# Patient Record
Sex: FEMALE | Race: WHITE | NOT HISPANIC OR LATINO | Employment: OTHER | URBAN - METROPOLITAN AREA
[De-identification: names, ages, dates, MRNs, and addresses within clinical notes are randomized per-mention and may not be internally consistent; named-entity substitution may affect disease eponyms.]

---

## 2018-01-25 ENCOUNTER — TRANSCRIBE ORDERS (OUTPATIENT)
Dept: ADMINISTRATIVE | Facility: HOSPITAL | Age: 68
End: 2018-01-25

## 2018-01-25 DIAGNOSIS — Z12.39 SCREENING BREAST EXAMINATION: Primary | ICD-10-CM

## 2018-02-12 ENCOUNTER — HOSPITAL ENCOUNTER (OUTPATIENT)
Dept: RADIOLOGY | Facility: HOSPITAL | Age: 68
Discharge: HOME | End: 2018-02-12
Payer: MEDICARE

## 2018-02-12 DIAGNOSIS — Z12.39 SCREENING BREAST EXAMINATION: ICD-10-CM

## 2018-02-12 PROCEDURE — 77067 SCR MAMMO BI INCL CAD: CPT

## 2018-02-12 PROCEDURE — 77063 BREAST TOMOSYNTHESIS BI: CPT

## 2018-02-16 ENCOUNTER — TRANSCRIBE ORDERS (OUTPATIENT)
Dept: ADMINISTRATIVE | Facility: HOSPITAL | Age: 68
End: 2018-02-16

## 2018-02-16 DIAGNOSIS — E04.1 NONTOXIC UNINODULAR GOITER: Primary | ICD-10-CM

## 2018-02-20 ENCOUNTER — HOSPITAL ENCOUNTER (OUTPATIENT)
Dept: RADIOLOGY | Facility: HOSPITAL | Age: 68
Discharge: HOME | End: 2018-02-20
Payer: MEDICARE

## 2018-02-20 DIAGNOSIS — E04.1 NONTOXIC UNINODULAR GOITER: ICD-10-CM

## 2018-02-20 PROCEDURE — 76536 US EXAM OF HEAD AND NECK: CPT

## 2019-03-28 ENCOUNTER — TRANSCRIBE ORDERS (OUTPATIENT)
Dept: ADMINISTRATIVE | Facility: HOSPITAL | Age: 69
End: 2019-03-28

## 2019-03-28 DIAGNOSIS — E28.39 RESISTANT OVARY SYNDROME: ICD-10-CM

## 2019-03-28 DIAGNOSIS — Z12.31 VISIT FOR SCREENING MAMMOGRAM: Primary | ICD-10-CM

## 2019-04-22 ENCOUNTER — HOSPITAL ENCOUNTER (OUTPATIENT)
Dept: RADIOLOGY | Facility: HOSPITAL | Age: 69
Discharge: HOME | End: 2019-04-22
Payer: COMMERCIAL

## 2019-04-22 VITALS — BODY MASS INDEX: 24.33 KG/M2 | WEIGHT: 155 LBS | HEIGHT: 67 IN

## 2019-04-22 DIAGNOSIS — Z12.31 VISIT FOR SCREENING MAMMOGRAM: ICD-10-CM

## 2019-04-22 DIAGNOSIS — E28.39 RESISTANT OVARY SYNDROME: ICD-10-CM

## 2019-04-22 PROCEDURE — 77080 DXA BONE DENSITY AXIAL: CPT

## 2019-04-22 PROCEDURE — 77067 SCR MAMMO BI INCL CAD: CPT

## 2019-04-22 PROCEDURE — 77063 BREAST TOMOSYNTHESIS BI: CPT

## 2020-07-09 ENCOUNTER — TRANSCRIBE ORDERS (OUTPATIENT)
Dept: ADMINISTRATIVE | Facility: HOSPITAL | Age: 70
End: 2020-07-09

## 2020-07-09 DIAGNOSIS — Z12.31 ENCOUNTER FOR SCREENING MAMMOGRAM FOR MALIGNANT NEOPLASM OF BREAST: Primary | ICD-10-CM

## 2020-08-26 ENCOUNTER — HOSPITAL ENCOUNTER (OUTPATIENT)
Dept: RADIOLOGY | Facility: HOSPITAL | Age: 70
Discharge: HOME | End: 2020-08-26
Payer: COMMERCIAL

## 2020-08-26 VITALS — HEIGHT: 67 IN | BODY MASS INDEX: 23.54 KG/M2 | WEIGHT: 150 LBS

## 2020-08-26 DIAGNOSIS — Z12.31 ENCOUNTER FOR SCREENING MAMMOGRAM FOR MALIGNANT NEOPLASM OF BREAST: ICD-10-CM

## 2020-08-26 PROCEDURE — 77067 SCR MAMMO BI INCL CAD: CPT

## 2020-08-26 PROCEDURE — 77063 BREAST TOMOSYNTHESIS BI: CPT

## 2021-09-02 ENCOUNTER — OFFICE VISIT (OUTPATIENT)
Dept: URGENT CARE | Facility: CLINIC | Age: 71
End: 2021-09-02
Payer: COMMERCIAL

## 2021-09-02 VITALS
HEART RATE: 79 BPM | OXYGEN SATURATION: 96 % | BODY MASS INDEX: 23.54 KG/M2 | HEIGHT: 67 IN | RESPIRATION RATE: 18 BRPM | DIASTOLIC BLOOD PRESSURE: 78 MMHG | TEMPERATURE: 99 F | SYSTOLIC BLOOD PRESSURE: 130 MMHG | WEIGHT: 150 LBS

## 2021-09-02 DIAGNOSIS — H81.392 PERIPHERAL VERTIGO INVOLVING LEFT EAR: Primary | ICD-10-CM

## 2021-09-02 DIAGNOSIS — H69.93 DISORDER OF BOTH EUSTACHIAN TUBES: ICD-10-CM

## 2021-09-02 PROBLEM — K57.90 DIVERTICULOSIS: Status: ACTIVE | Noted: 2019-04-09

## 2021-09-02 PROBLEM — K64.9 HEMORRHOIDS: Status: ACTIVE | Noted: 2019-04-09

## 2021-09-02 PROCEDURE — 99202 OFFICE O/P NEW SF 15 MIN: CPT | Performed by: PHYSICIAN ASSISTANT

## 2021-09-02 RX ORDER — ZOLPIDEM TARTRATE 12.5 MG/1
TABLET, FILM COATED, EXTENDED RELEASE ORAL
COMMUNITY

## 2021-09-02 RX ORDER — ONDANSETRON 4 MG/1
4 TABLET, ORALLY DISINTEGRATING ORAL ONCE
Status: COMPLETED | OUTPATIENT
Start: 2021-09-02 | End: 2021-09-02

## 2021-09-02 RX ORDER — PHENOL 1.4 %
1 AEROSOL, SPRAY (ML) MUCOUS MEMBRANE 2 TIMES DAILY
COMMUNITY

## 2021-09-02 RX ORDER — OMEPRAZOLE 40 MG/1
CAPSULE, DELAYED RELEASE ORAL DAILY
COMMUNITY

## 2021-09-02 RX ORDER — LORATADINE 10 MG/1
10 TABLET ORAL DAILY PRN
COMMUNITY

## 2021-09-02 RX ORDER — ONDANSETRON 4 MG/1
8 TABLET, ORALLY DISINTEGRATING ORAL ONCE
Status: DISCONTINUED | OUTPATIENT
Start: 2021-09-02 | End: 2021-09-02

## 2021-09-02 RX ORDER — LOSARTAN POTASSIUM 100 MG/1
TABLET ORAL DAILY
COMMUNITY

## 2021-09-02 RX ORDER — SENNOSIDES 8.6 MG
CAPSULE ORAL EVERY 6 HOURS PRN
COMMUNITY

## 2021-09-02 RX ORDER — METHYLPREDNISOLONE 4 MG/1
TABLET ORAL
Qty: 1 EACH | Refills: 0 | Status: SHIPPED | OUTPATIENT
Start: 2021-09-02

## 2021-09-02 RX ORDER — LEVOTHYROXINE SODIUM 0.05 MG/1
50 TABLET ORAL DAILY
COMMUNITY

## 2021-09-02 RX ORDER — ONDANSETRON 4 MG/1
4 TABLET, FILM COATED ORAL EVERY 8 HOURS PRN
Qty: 20 TABLET | Refills: 0 | Status: SHIPPED | OUTPATIENT
Start: 2021-09-02

## 2021-09-02 RX ORDER — ROSUVASTATIN CALCIUM 10 MG/1
TABLET, COATED ORAL
COMMUNITY

## 2021-09-02 RX ADMIN — ONDANSETRON 4 MG: 4 TABLET, ORALLY DISINTEGRATING ORAL at 13:02

## 2021-09-02 NOTE — PROGRESS NOTES
[facility]        NAME: Dahlia Barlow is a 69 y o  female  : 1950    MRN: [id number]  DATE: 2021  TIME: 1:18 PM    Assessment and Plan   Peripheral vertigo involving left ear [H81 [id number]]  1  Peripheral vertigo involving left ear  ondansetron (ZOFRAN) 4 mg tablet    methylPREDNISolone 4 MG tablet therapy pack    Ambulatory Referral to Otolaryngology    ondansetron (ZOFRAN-ODT) dispersible tablet 4 mg    ondansetron (ZOFRAN-ODT) dispersible tablet 4 mg    DISCONTINUED: ondansetron (ZOFRAN-ODT) dispersible tablet 8 mg     Vertigo provoked in office by lying patient supine  Episode controlled with patient focusing on a fixated object  She was given Zofran 8 mg tablet to alleviate resulting nausea  Discussed that hx and sx are most consistent with peripheral vertigo  Rx for medrol dose pack sent to pharmacy to reduce inflammation and help facilitate eustachian tube draining  Advised to d/c home Epley maneuver  Reviewed home safety precautions  Encouraged f/u with ENT in coming days but should see PCP if unable to get an appointment soon  Discussed possible need for imaging if sx persist  Pt agreeable to this tx plan  All questions answered  Precautions given  Patient Instructions     Take the steroid as reviewed with food  While on this medication avoid NSAIDs (advil, ibuprofen, naproxen, aleve, etc )  It is best to take this medication in the morning  Take Zofran if needed for nausea and vomiting  Avoid abrupt movement and hold onto sturdy furniture with activity  Fix your vision on a stationary object if vertigo occurs  Follow up with an ENT in 3-5 days  If you are unable to see an ENT in a reasonable amount of time f/u with your family doctor  Proceed to the ER if symptoms worsen  Chief Complaint     Chief Complaint   Patient presents with    Dizziness     x 8 weeks - vertigo  Saw PCP and tried Antivert x 3 days with no relief  S/S worsening x 1 week   Has L ear fullness, HA L forehead, vertigo with movement (feels like room spinning) with weakness and constant nausea and L upper jaw pain  Also tried Dramamine  Doing exercises BID  Denies URI s/s    Headache         History of Present Illness       68 y/o female presenting with c/o vertigo x 7-8 weeks  Episodes are characterized by room spinning and last a few minutes in duration  They are provoked by turning left, laying down, sitting up, standing, and bending over  She was seen by her PCP at onset and diagnosed with vertigo  Was instructed on Epley maneuver and prescribed meclizine 25 mg QID  No change in sx with meclizine or with home tx of dramamine  She has has found no change in frequency or severity of sx with performing Epley maneuver  Episodes are associated with nausea, dull/aching headaches, constant lightheadedness, and a persistent fullness or clogging of her left ear  She reports a hx of seasonal allergies that are reportedly mild, and typically treated with neti-pot and Claritin  Is not currently tx allergies  Reports PMH of pituitary adenoma treated with surgical resection and radiation  Also notes hypothyroidism, HTN, hypercholesterolemia, GERD, and macular degeneration  No recent changes in medications  No recent head injuries or change in hobbies/activities  No recent travel  Review of Systems   Review of Systems   Constitutional: Positive for fatigue  Negative for chills, diaphoresis and fever  HENT: Positive for ear pain (left, ache)  Negative for congestion, rhinorrhea and sore throat  Eyes: Negative for photophobia and visual disturbance  Respiratory: Negative for cough  Cardiovascular: Negative for chest pain and palpitations  Gastrointestinal: Positive for nausea  Negative for abdominal pain and vomiting  Neurological: Positive for dizziness, light-headedness and headaches  Negative for tremors, seizures, syncope, speech difficulty and weakness           Current Medications       Current Outpatient Medications:     acetaminophen (TYLENOL) 650 mg CR tablet, every 6 (six) hours as needed, Disp: , Rfl:     levothyroxine 50 mcg tablet, Take 50 mcg by mouth daily, Disp: , Rfl:     loratadine (CLARITIN) 10 mg tablet, Take 10 mg by mouth daily as needed for allergies, Disp: , Rfl:     losartan (COZAAR) 100 MG tablet, daily, Disp: , Rfl:     Multiple Vitamins-Minerals (Centrum Silver 50+Women) TABS, daily, Disp: , Rfl:     Multiple Vitamins-Minerals (PRESERVISION AREDS 2 PO), daily, Disp: , Rfl:     omeprazole (PriLOSEC) 40 MG capsule, daily, Disp: , Rfl:     rosuvastatin (CRESTOR) 10 MG tablet, daily at bedtime, Disp: , Rfl:     zolpidem (AMBIEN CR) 12 5 MG CR tablet, daily at bedtime as needed, Disp: , Rfl:     calcium carbonate (OS-EDGAR) 600 MG tablet, Take 1 tablet by mouth 2 (two) times a day (Patient not taking: Reported on 9/2/2021), Disp: , Rfl:     methylPREDNISolone 4 MG tablet therapy pack, Use as directed on package, Disp: 1 each, Rfl: 0    ondansetron (ZOFRAN) 4 mg tablet, Take 1 tablet (4 mg total) by mouth every 8 (eight) hours as needed for nausea or vomiting, Disp: 20 tablet, Rfl: 0    Current Facility-Administered Medications:     ondansetron (ZOFRAN-ODT) dispersible tablet 4 mg, 4 mg, Oral, Once, Arielle Adams PA-C    ondansetron (ZOFRAN-ODT) dispersible tablet 4 mg, 4 mg, Oral, Once, Lexie Blackman PA-C    Current Allergies     Allergies as of 09/02/2021    (No Known Allergies)            The following portions of the patient's history were reviewed and updated as appropriate: allergies, current medications, past family history, past medical history, past social history, past surgical history and problem list      Past Medical History:   Diagnosis Date    Allergic rhinitis     Arthritis     Disease of thyroid gland     GERD (gastroesophageal reflux disease)     Hypercholesteremia     Hypertension     Macular degeneration, dry     L eye       Past Surgical History: Procedure Laterality Date    BREAST BIOPSY Bilateral 1980    benign    EYE SURGERY Bilateral     bataracts with IOL    PITUITARY SURGERY      2017 surgical removal via nares and 2019 - radiation x 7 days       Family History   Problem Relation Age of Onset    Breast cancer Mother 46    Breast cancer Maternal Aunt     Breast cancer Maternal Aunt     Breast cancer Cousin     No Known Problems Cousin          Medications have been verified  Objective   /78   Pulse 79   Temp 99 °F (37 2 °C)   Resp 18   Ht 5' 7" (1 702 m)   Wt 68 kg (150 lb)   SpO2 96%   BMI 23 49 kg/m²   No LMP recorded  Patient is postmenopausal        Physical Exam     Physical Exam  Vitals and nursing note reviewed  Constitutional:       General: She is not in acute distress  Appearance: She is well-developed  She is not ill-appearing or diaphoretic  HENT:      Head: Normocephalic and atraumatic  Right Ear: Hearing, ear canal and external ear normal       Left Ear: Hearing, ear canal and external ear normal       Ears:      Comments: Serous effusion and mild bulging of TMs present b/l  Nystagmus and reports of vertigo provoked by lying patient supine and sitting upright  Episodes relieved with pt fixating on a set point in office  Nose: Nose normal       Mouth/Throat:      Mouth: Mucous membranes are not pale, not dry and not cyanotic  No oral lesions  Pharynx: Oropharynx is clear  Uvula midline  No pharyngeal swelling, oropharyngeal exudate, posterior oropharyngeal erythema or uvula swelling  Eyes:      General: Lids are normal          Right eye: No discharge  Left eye: No discharge  Extraocular Movements: Extraocular movements intact  Conjunctiva/sclera: Conjunctivae normal       Comments: Left beating nystagmus provoked by head movement  Neck:      Trachea: Phonation normal    Cardiovascular:      Rate and Rhythm: Normal rate and regular rhythm        Heart sounds: Normal heart sounds  Heart sounds not distant  Pulmonary:      Effort: Pulmonary effort is normal  No respiratory distress  Breath sounds: Normal breath sounds  No stridor  No decreased breath sounds, wheezing, rhonchi or rales  Musculoskeletal:      Cervical back: Neck supple  Lymphadenopathy:      Cervical: No cervical adenopathy  Skin:     General: Skin is warm and dry  Coloration: Skin is not pale  Findings: No erythema or rash  Neurological:      General: No focal deficit present  Mental Status: She is alert  She is not disoriented  Cranial Nerves: Cranial nerves are intact  No cranial nerve deficit  Coordination: Coordination normal       Gait: Gait normal    Psychiatric:         Behavior: Behavior normal  Behavior is cooperative  Thought Content:  Thought content normal          Judgment: Judgment normal

## 2021-09-02 NOTE — PATIENT INSTRUCTIONS
Take the steroid as reviewed with food  While on this medication avoid NSAIDs (advil, ibuprofen, naproxen, aleve, etc )  It is best to take this medication in the morning  Take Zofran if needed for nausea and vomiting  Avoid abrupt movement and hold onto sturdy furniture with activity  Fix your vision on a stationary object if vertigo occurs  Follow up with an ENT in 3-5 days  If you are unable to see an ENT in a reasonable amount of time f/u with your family doctor  Proceed to the ER if symptoms worsen

## 2021-12-31 ENCOUNTER — HOSPITAL ENCOUNTER (OUTPATIENT)
Dept: RADIOLOGY | Facility: HOSPITAL | Age: 71
Discharge: HOME | End: 2021-12-31
Payer: COMMERCIAL

## 2021-12-31 VITALS — BODY MASS INDEX: 24.33 KG/M2 | WEIGHT: 155 LBS | HEIGHT: 67 IN

## 2021-12-31 DIAGNOSIS — Z12.31 SCREENING MAMMOGRAM FOR HIGH-RISK PATIENT: ICD-10-CM

## 2021-12-31 PROCEDURE — 77063 BREAST TOMOSYNTHESIS BI: CPT

## 2021-12-31 PROCEDURE — 77067 SCR MAMMO BI INCL CAD: CPT

## 2024-03-12 ENCOUNTER — HOSPITAL ENCOUNTER (OUTPATIENT)
Dept: RADIOLOGY | Facility: HOSPITAL | Age: 74
Discharge: HOME | End: 2024-03-12
Payer: COMMERCIAL

## 2024-03-12 VITALS — WEIGHT: 155 LBS | BODY MASS INDEX: 24.33 KG/M2 | HEIGHT: 67 IN

## 2024-03-12 DIAGNOSIS — M85.859 OTHER SPECIFIED DISORDERS OF BONE DENSITY AND STRUCTURE, UNSPECIFIED THIGH: ICD-10-CM

## 2024-03-12 DIAGNOSIS — Z12.31 ENCOUNTER FOR SCREENING MAMMOGRAM FOR MALIGNANT NEOPLASM OF BREAST: ICD-10-CM

## 2024-03-12 PROCEDURE — 77067 SCR MAMMO BI INCL CAD: CPT

## 2024-03-12 PROCEDURE — 77080 DXA BONE DENSITY AXIAL: CPT

## 2024-03-12 PROCEDURE — 77063 BREAST TOMOSYNTHESIS BI: CPT

## 2024-04-25 ENCOUNTER — OFFICE VISIT (OUTPATIENT)
Age: 74
End: 2024-04-25
Payer: COMMERCIAL

## 2024-04-25 VITALS
RESPIRATION RATE: 16 BRPM | HEART RATE: 76 BPM | HEIGHT: 67 IN | WEIGHT: 158 LBS | BODY MASS INDEX: 24.8 KG/M2 | SYSTOLIC BLOOD PRESSURE: 177 MMHG | DIASTOLIC BLOOD PRESSURE: 82 MMHG

## 2024-04-25 DIAGNOSIS — M79.672 LEFT FOOT PAIN: ICD-10-CM

## 2024-04-25 DIAGNOSIS — S84.92XA NEURAPRAXIA OF LEFT LOWER EXTREMITY, INITIAL ENCOUNTER: ICD-10-CM

## 2024-04-25 DIAGNOSIS — S92.335A CLOSED NONDISPLACED FRACTURE OF THIRD METATARSAL BONE OF LEFT FOOT, INITIAL ENCOUNTER: ICD-10-CM

## 2024-04-25 DIAGNOSIS — S90.32XA CONTUSION OF LEFT FOOT, INITIAL ENCOUNTER: Primary | ICD-10-CM

## 2024-04-25 PROCEDURE — 99204 OFFICE O/P NEW MOD 45 MIN: CPT | Performed by: PODIATRIST

## 2024-04-25 RX ORDER — ETODOLAC 400 MG/1
400 TABLET, FILM COATED ORAL 2 TIMES DAILY
Qty: 20 TABLET | Refills: 0 | Status: SHIPPED | OUTPATIENT
Start: 2024-04-25 | End: 2024-05-05

## 2024-04-25 RX ORDER — GABAPENTIN 100 MG/1
100 CAPSULE ORAL
Qty: 30 CAPSULE | Refills: 1 | Status: SHIPPED | OUTPATIENT
Start: 2024-04-25 | End: 2024-06-24

## 2024-04-25 RX ORDER — ALENDRONATE SODIUM 70 MG/1
70 TABLET ORAL
COMMUNITY

## 2024-04-25 NOTE — PROGRESS NOTES
Assessment/Plan: History of injury left foot.  Crush injury/contusion left foot.  Rule out Lisfranc joint injury versus metatarsal fracture.  Neuropraxia of dorsal cutaneous nerve.  Pain.    Plan.  Chart reviewed.  Primary care notes reviewed.  Patient examined.  Patient has been advised on condition.  At this time we will treat for osseous injury.  Patient be immobilized in Aircast.  X-rays ordered to rule out possible metatarsal fracture, delayed union or Lisfranc joint injury.  Patient be placed on Lodine.  In addition we will add gabapentin to help with any possible neuropraxia associated with injury.  Return for follow-up.  Care plan to be determined after x-ray evaluation as well as clinical course.  Return for follow-up.         Diagnoses and all orders for this visit:    Contusion of left foot, initial encounter  -     X-ray foot left 3+ views; Future  -     etodolac (LODINE) 400 MG tablet; Take 1 tablet (400 mg total) by mouth 2 (two) times a day for 10 days  -     gabapentin (NEURONTIN) 100 mg capsule; Take 1 capsule (100 mg total) by mouth daily at bedtime    Closed nondisplaced fracture of third metatarsal bone of left foot, initial encounter  -     X-ray foot left 3+ views; Future  -     etodolac (LODINE) 400 MG tablet; Take 1 tablet (400 mg total) by mouth 2 (two) times a day for 10 days  -     gabapentin (NEURONTIN) 100 mg capsule; Take 1 capsule (100 mg total) by mouth daily at bedtime    Left foot pain  -     X-ray foot left 3+ views; Future  -     etodolac (LODINE) 400 MG tablet; Take 1 tablet (400 mg total) by mouth 2 (two) times a day for 10 days  -     gabapentin (NEURONTIN) 100 mg capsule; Take 1 capsule (100 mg total) by mouth daily at bedtime    Neurapraxia of left lower extremity, initial encounter  -     X-ray foot left 3+ views; Future  -     etodolac (LODINE) 400 MG tablet; Take 1 tablet (400 mg total) by mouth 2 (two) times a day for 10 days  -     gabapentin (NEURONTIN) 100 mg capsule;  Take 1 capsule (100 mg total) by mouth daily at bedtime    Other orders  -     alendronate (FOSAMAX) 70 mg tablet; Take 70 mg by mouth every 7 days          Subjective: Patient has pain.  She has pain in her left foot.  She relates a history of approximately 2 months prior to dropping a very heavy object on her left foot.  She sought no medical treatment.  Pain has been ongoing.  Patient gets pain as the day progresses and ambulation ensues.    No Known Allergies      Current Outpatient Medications:     alendronate (FOSAMAX) 70 mg tablet, Take 70 mg by mouth every 7 days, Disp: , Rfl:     etodolac (LODINE) 400 MG tablet, Take 1 tablet (400 mg total) by mouth 2 (two) times a day for 10 days, Disp: 20 tablet, Rfl: 0    gabapentin (NEURONTIN) 100 mg capsule, Take 1 capsule (100 mg total) by mouth daily at bedtime, Disp: 30 capsule, Rfl: 1    acetaminophen (TYLENOL) 650 mg CR tablet, every 6 (six) hours as needed, Disp: , Rfl:     calcium carbonate (OS-EDGAR) 600 MG tablet, Take 1 tablet by mouth 2 (two) times a day (Patient not taking: Reported on 9/2/2021), Disp: , Rfl:     levothyroxine 50 mcg tablet, Take 50 mcg by mouth daily, Disp: , Rfl:     loratadine (CLARITIN) 10 mg tablet, Take 10 mg by mouth daily as needed for allergies, Disp: , Rfl:     losartan (COZAAR) 100 MG tablet, daily, Disp: , Rfl:     methylPREDNISolone 4 MG tablet therapy pack, Use as directed on package, Disp: 1 each, Rfl: 0    Multiple Vitamins-Minerals (Centrum Silver 50+Women) TABS, daily, Disp: , Rfl:     Multiple Vitamins-Minerals (PRESERVISION AREDS 2 PO), daily, Disp: , Rfl:     omeprazole (PriLOSEC) 40 MG capsule, daily, Disp: , Rfl:     ondansetron (ZOFRAN) 4 mg tablet, Take 1 tablet (4 mg total) by mouth every 8 (eight) hours as needed for nausea or vomiting, Disp: 20 tablet, Rfl: 0    rosuvastatin (CRESTOR) 10 MG tablet, daily at bedtime, Disp: , Rfl:     zolpidem (AMBIEN CR) 12.5 MG CR tablet, daily at bedtime as needed, Disp: , Rfl:      Patient Active Problem List   Diagnosis    Diverticulosis    Hemorrhoids          Patient ID: Suzie Smyth is a 73 y.o. female.    HPI    The following portions of the patient's history were reviewed and updated as appropriate:     family history includes BRCA1 Negative in her cousin; BRCA2 Negative in her cousin; Breast cancer in her cousin, maternal aunt, and maternal aunt; Breast cancer (age of onset: 52) in her mother.      reports that she has never smoked. She has never used smokeless tobacco. She reports current alcohol use. She reports that she does not use drugs.    Vitals:    04/25/24 1648   BP: (!) 177/82   Pulse: 76   Resp: 16       Review of Systems      Objective:  Patient's shoes and socks removed.   Foot Exam    General  General Appearance: appears stated age and healthy   Orientation: alert and oriented to person, place, and time   Affect: appropriate   Gait: antalgic       Right Foot/Ankle     Inspection and Palpation  Arch: pes cavus  Hammertoes: fifth toe  Skin Exam: skin intact;     Neurovascular  Dorsalis pedis: 3+  Posterior tibial: 3+      Left Foot/Ankle      Inspection and Palpation  Ecchymosis: dorsum  Tenderness: bony tenderness, lesser metatarsophalangeal joints and metatarsals   Swelling: dorsum   Arch: pes cavus  Skin Exam: skin intact;     Neurovascular  Dorsalis pedis: 3+  Posterior tibial: 3+      Physical Exam  Vitals and nursing note reviewed.   Constitutional:       Appearance: Normal appearance.   Cardiovascular:      Rate and Rhythm: Normal rate and regular rhythm.      Pulses:           Dorsalis pedis pulses are 3+ on the right side and 3+ on the left side.        Posterior tibial pulses are 3+ on the right side and 3+ on the left side.   Musculoskeletal:         General: Tenderness present.      Left lower leg: Edema present.      Left foot: Bony tenderness present.   Feet:      Comments: Patient has pes cavus bilateral.  Left foot demonstrates swelling and induration in  the area of the Lisfranc joint/metatarsal bases.  There is pain with palpation third metatarsal base.  Positive Tinel of dorsal cutaneous nerve.  There is pain with range of motion of second third and fourth ray of the left foot.  Skin:     Capillary Refill: Capillary refill takes less than 2 seconds.   Neurological:      Mental Status: She is alert.   Psychiatric:         Mood and Affect: Mood normal.         Behavior: Behavior normal.         Thought Content: Thought content normal.         Judgment: Judgment normal.

## 2024-04-26 ENCOUNTER — HOSPITAL ENCOUNTER (OUTPATIENT)
Dept: RADIOLOGY | Facility: HOSPITAL | Age: 74
Discharge: HOME | End: 2024-04-26
Payer: COMMERCIAL

## 2024-04-26 DIAGNOSIS — S92.335A CLOSED NONDISPLACED FRACTURE OF THIRD METATARSAL BONE OF LEFT FOOT, INITIAL ENCOUNTER: ICD-10-CM

## 2024-04-26 DIAGNOSIS — S90.32XA CONTUSION OF LEFT FOOT, INITIAL ENCOUNTER: ICD-10-CM

## 2024-04-26 DIAGNOSIS — S84.92XA NEURAPRAXIA OF LEFT LOWER EXTREMITY, INITIAL ENCOUNTER: ICD-10-CM

## 2024-04-26 DIAGNOSIS — M79.672 LEFT FOOT PAIN: ICD-10-CM

## 2024-04-26 PROCEDURE — 73630 X-RAY EXAM OF FOOT: CPT

## 2024-05-03 ENCOUNTER — TELEPHONE (OUTPATIENT)
Age: 74
End: 2024-05-03

## 2024-05-03 NOTE — TELEPHONE ENCOUNTER
Spoke with patient she is aware of results, she also asked about wearing the boot. Per Dr. Peters she is to continue to wear the boot until her next office visit with him.----- Message from Ayan Peters DPM sent at 5/3/2024 12:36 PM EDT -----  Please advise patient x-ray shows no evidence of broken bone.  However there is arthritis  ----- Message -----  From: Interface, Radiology Results In  Sent: 5/3/2024  11:51 AM EDT  To: Ayan Peters DPM

## 2024-05-24 ENCOUNTER — OFFICE VISIT (OUTPATIENT)
Age: 74
End: 2024-05-24
Payer: COMMERCIAL

## 2024-05-24 VITALS
WEIGHT: 158 LBS | DIASTOLIC BLOOD PRESSURE: 72 MMHG | RESPIRATION RATE: 17 BRPM | HEART RATE: 67 BPM | BODY MASS INDEX: 24.8 KG/M2 | HEIGHT: 67 IN | SYSTOLIC BLOOD PRESSURE: 135 MMHG

## 2024-05-24 DIAGNOSIS — S90.32XD CONTUSION OF LEFT FOOT, SUBSEQUENT ENCOUNTER: Primary | ICD-10-CM

## 2024-05-24 DIAGNOSIS — S84.92XD NEURAPRAXIA OF LEFT LOWER EXTREMITY, SUBSEQUENT ENCOUNTER: ICD-10-CM

## 2024-05-24 DIAGNOSIS — S92.335D CLOSED NONDISPLACED FRACTURE OF THIRD METATARSAL BONE OF LEFT FOOT WITH ROUTINE HEALING, SUBSEQUENT ENCOUNTER: ICD-10-CM

## 2024-05-24 DIAGNOSIS — M79.672 LEFT FOOT PAIN: ICD-10-CM

## 2024-05-24 PROCEDURE — 99213 OFFICE O/P EST LOW 20 MIN: CPT | Performed by: PODIATRIST

## 2024-05-24 NOTE — PROGRESS NOTES
Assessment/Plan: Contusion left foot.  Crush injury/metatarsal base fracture third left.  Left foot pain.  Neuropraxia, resolving    Plan.  Chart reviewed.  PCP notes reviewed.  X-rays reviewed with patient.  At this time patient is doing well.  Will discontinue cast.  We recommend patient start using Voltaren gel as directed.  If symptoms return we will consider MRI and possibly injection therapy.  Aftercare instruction given.  Return as needed.         Diagnoses and all orders for this visit:    Contusion of left foot, subsequent encounter    Closed nondisplaced fracture of third metatarsal bone of left foot with routine healing, subsequent encounter    Left foot pain    Neurapraxia of left lower extremity, subsequent encounter          Subjective: Patient is doing significant better.  She is using Aircast as directed.  She has no pain at this time.    No Known Allergies      Current Outpatient Medications:     acetaminophen (TYLENOL) 650 mg CR tablet, every 6 (six) hours as needed, Disp: , Rfl:     alendronate (FOSAMAX) 70 mg tablet, Take 70 mg by mouth every 7 days, Disp: , Rfl:     calcium carbonate (OS-EDGAR) 600 MG tablet, Take 1 tablet by mouth 2 (two) times a day (Patient not taking: Reported on 9/2/2021), Disp: , Rfl:     etodolac (LODINE) 400 MG tablet, Take 1 tablet (400 mg total) by mouth 2 (two) times a day for 10 days, Disp: 20 tablet, Rfl: 0    gabapentin (NEURONTIN) 100 mg capsule, Take 1 capsule (100 mg total) by mouth daily at bedtime, Disp: 30 capsule, Rfl: 1    levothyroxine 50 mcg tablet, Take 50 mcg by mouth daily, Disp: , Rfl:     loratadine (CLARITIN) 10 mg tablet, Take 10 mg by mouth daily as needed for allergies, Disp: , Rfl:     losartan (COZAAR) 100 MG tablet, daily, Disp: , Rfl:     methylPREDNISolone 4 MG tablet therapy pack, Use as directed on package, Disp: 1 each, Rfl: 0    Multiple Vitamins-Minerals (Centrum Silver 50+Women) TABS, daily, Disp: , Rfl:     Multiple Vitamins-Minerals  (PRESERVISION AREDS 2 PO), daily, Disp: , Rfl:     omeprazole (PriLOSEC) 40 MG capsule, daily, Disp: , Rfl:     ondansetron (ZOFRAN) 4 mg tablet, Take 1 tablet (4 mg total) by mouth every 8 (eight) hours as needed for nausea or vomiting, Disp: 20 tablet, Rfl: 0    rosuvastatin (CRESTOR) 10 MG tablet, daily at bedtime, Disp: , Rfl:     zolpidem (AMBIEN CR) 12.5 MG CR tablet, daily at bedtime as needed, Disp: , Rfl:     Patient Active Problem List   Diagnosis    Diverticulosis    Hemorrhoids    Contusion of left foot, initial encounter          Patient ID: Suzie Smyth is a 73 y.o. female.    HPI    The following portions of the patient's history were reviewed and updated as appropriate:     family history includes BRCA1 Negative in her cousin; BRCA2 Negative in her cousin; Breast cancer in her cousin, maternal aunt, and maternal aunt; Breast cancer (age of onset: 52) in her mother.      reports that she has never smoked. She has never used smokeless tobacco. She reports current alcohol use. She reports that she does not use drugs.    Vitals:    05/24/24 1423   BP: 135/72   Pulse: 67   Resp: 17       Review of Systems      Objective:  Patient's shoes and socks removed.   Foot ExamPhysical Exam

## 2025-01-14 ENCOUNTER — OFFICE VISIT (OUTPATIENT)
Dept: URGENT CARE | Facility: CLINIC | Age: 75
End: 2025-01-14
Payer: COMMERCIAL

## 2025-01-14 VITALS
WEIGHT: 156 LBS | BODY MASS INDEX: 24.48 KG/M2 | SYSTOLIC BLOOD PRESSURE: 144 MMHG | OXYGEN SATURATION: 96 % | TEMPERATURE: 99 F | DIASTOLIC BLOOD PRESSURE: 72 MMHG | HEART RATE: 98 BPM | HEIGHT: 67 IN | RESPIRATION RATE: 18 BRPM

## 2025-01-14 DIAGNOSIS — M62.830 SPASM OF MUSCLE OF LOWER BACK: Primary | ICD-10-CM

## 2025-01-14 PROCEDURE — 99213 OFFICE O/P EST LOW 20 MIN: CPT | Performed by: PHYSICIAN ASSISTANT

## 2025-01-14 RX ORDER — METAXALONE 800 MG/1
800 TABLET ORAL EVERY 6 HOURS PRN
Qty: 21 TABLET | Refills: 0 | Status: SHIPPED | OUTPATIENT
Start: 2025-01-14

## 2025-01-14 NOTE — PROGRESS NOTES
[facility]        NAME: Suzie Smyth is a 74 y.o. female  : 1950    MRN: [id number]  DATE: 2025  TIME: 9:31 AM    Assessment and Plan   Spasm of muscle of lower back [M62.830]  1. Spasm of muscle of lower back  metaxalone (SKELAXIN) 800 mg tablet          Patient Instructions   Low back muscle spasm  Rx Skelaxin 3 times a day as needed, sent via EMR  Recommend heat and topical IcyHot/Voltaren gel  When feeling better since recommend light back stretching exercises    Follow up with PCP in 3-5 days.  Proceed to  ER if symptoms worsen.    If tests have been performed at [facility], our office will contact you with results if changes need to be made to the care plan discussed with you at the visit.  [facility]    Chief Complaint     Chief Complaint   Patient presents with    Back Pain     C/O of left lower back pain radiates side ways, and upward. Describes the pain as shooting pain started yesterday. Took Tylenol as need it.         History of Present Illness       Suzie is a 74-year-old female who presents to clinic complaining of left lower back pain x 2 days.  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  She states a dull constant pain with intermittent tight spasms and sharp pain.  She denies any paresthesias or pain going down her leg.  She denies any incontinence.  Denies any injury or trauma.        Review of Systems   Review of Systems   Constitutional: Negative.    Musculoskeletal:  Positive for back pain.         Current Medications       Current Outpatient Medications:     acetaminophen (TYLENOL) 650 mg CR tablet, every 6 (six) hours as needed, Disp: , Rfl:     alendronate (FOSAMAX) 70 mg tablet, Take 70 mg by mouth every 7 days, Disp: , Rfl:     calcium carbonate (OS-EDGAR) 600 MG tablet, Take 1 tablet by mouth 2 (two) times a day, Disp: , Rfl:     levothyroxine 50  mcg tablet, Take 50 mcg by mouth daily, Disp: , Rfl:     loratadine (CLARITIN) 10 mg tablet, Take 10 mg by mouth daily as needed for allergies, Disp: , Rfl:     losartan (COZAAR) 100 MG tablet, daily, Disp: , Rfl:     metaxalone (SKELAXIN) 800 mg tablet, Take 1 tablet (800 mg total) by mouth every 6 (six) hours as needed for muscle spasms, Disp: 21 tablet, Rfl: 0    Multiple Vitamins-Minerals (Centrum Silver 50+Women) TABS, daily, Disp: , Rfl:     omeprazole (PriLOSEC) 40 MG capsule, daily, Disp: , Rfl:     ondansetron (ZOFRAN) 4 mg tablet, Take 1 tablet (4 mg total) by mouth every 8 (eight) hours as needed for nausea or vomiting, Disp: 20 tablet, Rfl: 0    rosuvastatin (CRESTOR) 10 MG tablet, daily at bedtime, Disp: , Rfl:     zolpidem (AMBIEN CR) 12.5 MG CR tablet, daily at bedtime as needed, Disp: , Rfl:     etodolac (LODINE) 400 MG tablet, Take 1 tablet (400 mg total) by mouth 2 (two) times a day for 10 days, Disp: 20 tablet, Rfl: 0    gabapentin (NEURONTIN) 100 mg capsule, Take 1 capsule (100 mg total) by mouth daily at bedtime, Disp: 30 capsule, Rfl: 1    methylPREDNISolone 4 MG tablet therapy pack, Use as directed on package (Patient not taking: Reported on 1/14/2025), Disp: 1 each, Rfl: 0    Multiple Vitamins-Minerals (PRESERVISION AREDS 2 PO), daily (Patient not taking: Reported on 1/14/2025), Disp: , Rfl:     Current Allergies     Allergies as of 01/14/2025 - Reviewed 01/14/2025   Allergen Reaction Noted    Other Other (See Comments) 01/14/2025            The following portions of the patient's history were reviewed and updated as appropriate: allergies, current medications, past family history, past medical history, past social history, past surgical history and problem list.     Past Medical History:   Diagnosis Date    Allergic rhinitis     Arthritis     Disease of thyroid gland     GERD (gastroesophageal reflux disease)     Hypercholesteremia     Hypertension     Macular degeneration, dry     L eye  "      Past Surgical History:   Procedure Laterality Date    BREAST BIOPSY Bilateral 1980    benign    EYE SURGERY Bilateral     bataracts with IOL    PITUITARY SURGERY      2017 surgical removal via nares and 2019 - radiation x 7 days       Family History   Problem Relation Age of Onset    Breast cancer Mother 52    Breast cancer Maternal Aunt     Breast cancer Maternal Aunt     Breast cancer Cousin     BRCA1 Negative Cousin     BRCA2 Negative Cousin          Medications have been verified.        Objective   /72   Pulse 98   Temp 99 °F (37.2 °C)   Resp 18   Ht 5' 7\" (1.702 m)   Wt 70.8 kg (156 lb)   SpO2 96%   BMI 24.43 kg/m²   No LMP recorded. Patient is postmenopausal.       Physical Exam     Physical Exam  Vitals and nursing note reviewed.   Constitutional:       General: She is not in acute distress.     Appearance: Normal appearance. She is not ill-appearing.   Pulmonary:      Effort: Pulmonary effort is normal.   Musculoskeletal:      Lumbar back: Spasms and tenderness present. No swelling, edema, deformity, signs of trauma or bony tenderness. Decreased range of motion.   Neurological:      Mental Status: She is alert and oriented to person, place, and time.   Psychiatric:         Mood and Affect: Mood normal.         Behavior: Behavior normal.                   "